# Patient Record
Sex: FEMALE | Race: WHITE | NOT HISPANIC OR LATINO | URBAN - METROPOLITAN AREA
[De-identification: names, ages, dates, MRNs, and addresses within clinical notes are randomized per-mention and may not be internally consistent; named-entity substitution may affect disease eponyms.]

---

## 2020-08-25 ENCOUNTER — LAB OUTSIDE AN ENCOUNTER (OUTPATIENT)
Dept: URBAN - METROPOLITAN AREA CLINIC 78 | Facility: CLINIC | Age: 70
End: 2020-08-25

## 2020-08-25 ENCOUNTER — OFFICE VISIT (OUTPATIENT)
Dept: URBAN - METROPOLITAN AREA CLINIC 23 | Facility: CLINIC | Age: 70
End: 2020-08-25
Payer: COMMERCIAL

## 2020-08-25 DIAGNOSIS — R19.7 ACUTE DIARRHEA: ICD-10-CM

## 2020-08-25 DIAGNOSIS — Z86.010 HISTORY OF ADENOMATOUS POLYP OF COLON: ICD-10-CM

## 2020-08-25 PROCEDURE — 1036F TOBACCO NON-USER: CPT | Performed by: INTERNAL MEDICINE

## 2020-08-25 PROCEDURE — G9622 NO UNHEAL ETOH USER: HCPCS | Performed by: INTERNAL MEDICINE

## 2020-08-25 PROCEDURE — G9903 PT SCRN TBCO ID AS NON USER: HCPCS | Performed by: INTERNAL MEDICINE

## 2020-08-25 PROCEDURE — G8427 DOCREV CUR MEDS BY ELIG CLIN: HCPCS | Performed by: INTERNAL MEDICINE

## 2020-08-25 PROCEDURE — 3017F COLORECTAL CA SCREEN DOC REV: CPT | Performed by: INTERNAL MEDICINE

## 2020-08-25 PROCEDURE — 99214 OFFICE O/P EST MOD 30 MIN: CPT | Performed by: INTERNAL MEDICINE

## 2020-08-25 PROCEDURE — G8420 CALC BMI NORM PARAMETERS: HCPCS | Performed by: INTERNAL MEDICINE

## 2020-08-25 RX ORDER — PANTOPRAZOLE SODIUM 40 MG/1
TAKE 1 TABLET (40 MG) BY ORAL ROUTE ONCE DAILY FOR 90 DAYS TABLET, DELAYED RELEASE ORAL 1
Qty: 90 | Refills: 3 | Status: ACTIVE | COMMUNITY
Start: 2019-12-16 | End: 2020-12-10

## 2020-08-25 RX ORDER — CIPROFLOXACIN HYDROCHLORIDE 500 MG/1
1 TABLET TABLET, FILM COATED ORAL BID
Qty: 20 TABLET | Refills: 0 | OUTPATIENT
Start: 2020-08-25 | End: 2020-09-04

## 2020-08-25 RX ORDER — METRONIDAZOLE 500 MG/1
1 TABLET TABLET, FILM COATED ORAL BID
Qty: 20 TABLET | Refills: 0 | OUTPATIENT
Start: 2020-08-25 | End: 2020-09-04

## 2020-08-25 NOTE — HPI-TODAY'S VISIT:
70-year-old  female with a lactose intolerance presents with 2 weeks history of acute onset diarrhea.  She ate ice cream is more than usual at the beginning of this episode.  4-5 hours she had to sit on the toilet.  Antibiotic use in June when she was having back surgery.  She tried Imodium couple doses, Pepto-Bismol a few days.  Currently she is not on any antidiarrheal.  She has limited her diet only to simple carbohydrate.  Had only one bowel movement yesterday, BSs type VI-VII.  She reports that is because she is not eating.  If she eats something it will blow out.  5 pounds of weight loss over the same time..  Denies blood in stool.  Her typical bowel habit 1 bowel movement with a solid-soft consistency stool.  Denies NSAIDs use.

## 2020-08-25 NOTE — PREVIOUS WORKUP REVIEWED
:ENDOSCOPIES:-EGD 1/16/2019: indication of her from. Normal esophagus. Mild gastropathy in the antrum. Multiple polyps in the gastric fundus and body. Otherwise normal stomach and duodenum.*Pathology: gastric body and antrum) reactive gastropathy and the mild chronic inflammation. No H. pylori or intestinal metaplasia. Gastric fundus polyp-fundic gland polyp-Colonoscopy 4/26/2017:A 3 mm polyp in the sigmoid colon. Normal TI. Otherwise normal colon.*Pathology: sigmoid colon polyp-colonic mucosa with prolapsed change.-Colonoscopy 8/15/2012: normal. Colonoscopy 5 years.-Colonoscopy 5/16/2012: a flat polyp in the cecum. 3 cm in size. It was removed Piecemeal technic. Tattooed. A sessile polyp in the transverse colon, 6 mm in size. Random colon biopsy to rule microscopic colitis.*Pathology: transverse colon polyp-prominent lymphoid garcia. Random colon-negative microscopic colitis. Cecal polyp-hyperplastic polyp.LABS:IMAGES:

## 2020-08-26 LAB
BUN/CREATININE RATIO: 10
BUN: 7
CARBON DIOXIDE, TOTAL: 25
CHLORIDE: 98
CREATININE: 0.71
EGFR IF AFRICN AM: 100
EGFR IF NONAFRICN AM: 87
GLUCOSE: 105
HEMATOCRIT: 37.4
HEMOGLOBIN: 12.7
MCH: 29.6
MCHC: 34
MCV: 87
NRBC: (no result)
PLATELETS: 345
POTASSIUM: 4.4
RBC: 4.29
RDW: 14.4
SODIUM: 142
WBC: 4.4

## 2020-08-28 ENCOUNTER — TELEPHONE ENCOUNTER (OUTPATIENT)
Dept: URBAN - METROPOLITAN AREA CLINIC 77 | Facility: CLINIC | Age: 70
End: 2020-08-28

## 2020-08-31 ENCOUNTER — TELEPHONE ENCOUNTER (OUTPATIENT)
Dept: URBAN - METROPOLITAN AREA CLINIC 77 | Facility: CLINIC | Age: 70
End: 2020-08-31

## 2020-08-31 LAB — GASTROINTESTINAL PATHOGEN: (no result)

## 2020-09-02 ENCOUNTER — TELEPHONE ENCOUNTER (OUTPATIENT)
Dept: URBAN - METROPOLITAN AREA CLINIC 77 | Facility: CLINIC | Age: 70
End: 2020-09-02

## 2020-09-04 ENCOUNTER — WEB ENCOUNTER (OUTPATIENT)
Dept: URBAN - METROPOLITAN AREA CLINIC 23 | Facility: CLINIC | Age: 70
End: 2020-09-04

## 2020-09-22 ENCOUNTER — OFFICE VISIT (OUTPATIENT)
Dept: URBAN - METROPOLITAN AREA CLINIC 23 | Facility: CLINIC | Age: 70
End: 2020-09-22
Payer: COMMERCIAL

## 2020-09-22 DIAGNOSIS — E73.9 LACTOSE INTOLERANCE: ICD-10-CM

## 2020-09-22 DIAGNOSIS — K58.0 IRRITABLE BOWEL SYNDROME WITH DIARRHEA: ICD-10-CM

## 2020-09-22 DIAGNOSIS — K21.9 CHRONIC GERD: ICD-10-CM

## 2020-09-22 PROBLEM — 197125005: Status: ACTIVE | Noted: 2020-09-22

## 2020-09-22 PROCEDURE — 3017F COLORECTAL CA SCREEN DOC REV: CPT | Performed by: INTERNAL MEDICINE

## 2020-09-22 PROCEDURE — 1036F TOBACCO NON-USER: CPT | Performed by: INTERNAL MEDICINE

## 2020-09-22 PROCEDURE — G8427 DOCREV CUR MEDS BY ELIG CLIN: HCPCS | Performed by: INTERNAL MEDICINE

## 2020-09-22 PROCEDURE — G8420 CALC BMI NORM PARAMETERS: HCPCS | Performed by: INTERNAL MEDICINE

## 2020-09-22 PROCEDURE — 99214 OFFICE O/P EST MOD 30 MIN: CPT | Performed by: INTERNAL MEDICINE

## 2020-09-22 PROCEDURE — G9622 NO UNHEAL ETOH USER: HCPCS | Performed by: INTERNAL MEDICINE

## 2020-09-22 RX ORDER — OMEPRAZOLE 20 MG/1
1 CAPSULE 30 MINUTES BEFORE MORNING MEAL CAPSULE, DELAYED RELEASE ORAL ONCE A DAY
Qty: 90 TABS | Refills: 1 | OUTPATIENT
Start: 2020-09-22

## 2020-09-22 RX ORDER — PANTOPRAZOLE SODIUM 40 MG/1
TAKE 1 TABLET (40 MG) BY ORAL ROUTE ONCE DAILY FOR 90 DAYS TABLET, DELAYED RELEASE ORAL 1
Qty: 90 | Refills: 3 | Status: ACTIVE | COMMUNITY
Start: 2019-12-16 | End: 2020-12-10

## 2020-09-22 NOTE — HPI-TODAY'S VISIT:
70-year-old  female present for follow-up of diarrhea.  At last visit, abx rxed. She took 1 day of antibiotics and developed leg cramping so she stopped it.  Her bowel movement is irregular, 1 day diarrhea then no bowel movements for couple days, then solid stools, then diarrhea again.  She claims certain foods causing diarrhea.  She is still significantly restricted her diet due to concern for having diarrhea. losing weight. She tried apple and carrot which caused diarrhea next day. She knows she has lactose intolerance, taking Lactaid pill which helps great.  She changed her PPI to Prilosec 20 mg with a counter since that she is wondering pantoprazole might have caused diarrhea.  Reflux symptoms are well controlled with the Prilosec.

## 2020-09-22 NOTE — PHYSICAL EXAM SKIN:
no rashes,  no suspicious lesions,  no areas of discoloration,  no jaundice present,  good turgor,  no masses,  no tenderness on palpation

## 2020-09-22 NOTE — PHYSICAL EXAM NECK/THYROID:
normal appearance,  without tenderness upon palpation,  no deformities,  no cervical lymphadenopathy,  no masses,  no thyroid nodules,  Thyroid normal size,  no JVD,  thyroid nontender

## 2020-09-22 NOTE — PHYSICAL EXAM NEUROLOGIC:
oriented to person, place and time,  normal sensation,  short and long term memory intact,  sensory exam intact

## 2020-09-22 NOTE — PHYSICAL EXAM GASTROINTESTINAL
Abdomen,  soft, nontender, nondistended,  no guarding or rigidity,  no masses palpable,  normal bowel sounds,  Liver and Spleen,  no hepatomegaly present,  no hepatosplenomegaly,  liver nontender,  spleen not palpable,  Self Exam: Abdomen soft, non-tender to palpatation, non-distended

## 2020-09-22 NOTE — PHYSICAL EXAM CHEST:
no lesions,  no deformities,  no traumatic injuries,  no significant scars are present,  chest wall non-tender,  no masses present,  tactile fremitus is normal,  breathing is unlabored without accessory muscle use.,  normal breath sounds.

## 2020-09-24 ENCOUNTER — OFFICE VISIT (OUTPATIENT)
Dept: URBAN - METROPOLITAN AREA TELEHEALTH 2 | Facility: TELEHEALTH | Age: 70
End: 2020-09-24
Payer: COMMERCIAL

## 2020-09-24 ENCOUNTER — WEB ENCOUNTER (OUTPATIENT)
Dept: URBAN - METROPOLITAN AREA CLINIC 23 | Facility: CLINIC | Age: 70
End: 2020-09-24

## 2020-09-24 DIAGNOSIS — K58.0 IRRITABLE BOWEL SYNDROME WITH DIARRHEA: ICD-10-CM

## 2020-09-24 PROCEDURE — 97802 MEDICAL NUTRITION INDIV IN: CPT | Performed by: DIETITIAN, REGISTERED

## 2020-09-24 RX ORDER — OMEPRAZOLE 20 MG/1
1 CAPSULE 30 MINUTES BEFORE MORNING MEAL CAPSULE, DELAYED RELEASE ORAL ONCE A DAY
Qty: 90 TABS | Refills: 1 | Status: ACTIVE | COMMUNITY
Start: 2020-09-22

## 2020-09-24 RX ORDER — PANTOPRAZOLE SODIUM 40 MG/1
TAKE 1 TABLET (40 MG) BY ORAL ROUTE ONCE DAILY FOR 90 DAYS TABLET, DELAYED RELEASE ORAL 1
Qty: 90 | Refills: 3 | Status: ACTIVE | COMMUNITY
Start: 2019-12-16 | End: 2020-12-10

## 2020-09-27 ENCOUNTER — WEB ENCOUNTER (OUTPATIENT)
Dept: URBAN - METROPOLITAN AREA CLINIC 23 | Facility: CLINIC | Age: 70
End: 2020-09-27

## 2021-03-17 ENCOUNTER — TELEPHONE ENCOUNTER (OUTPATIENT)
Dept: URBAN - METROPOLITAN AREA CLINIC 77 | Facility: CLINIC | Age: 71
End: 2021-03-17

## 2021-03-17 RX ORDER — OMEPRAZOLE 20 MG/1
1 CAPSULE 30 MINUTES BEFORE MORNING MEAL CAPSULE, DELAYED RELEASE ORAL ONCE A DAY
Qty: 90 | Refills: 3
Start: 2020-09-22

## 2022-02-08 ENCOUNTER — OFFICE VISIT (OUTPATIENT)
Dept: URBAN - METROPOLITAN AREA CLINIC 12 | Facility: CLINIC | Age: 72
End: 2022-02-08
Payer: COMMERCIAL

## 2022-02-08 DIAGNOSIS — R19.5 LOOSE STOOLS: ICD-10-CM

## 2022-02-08 DIAGNOSIS — R19.4 CHANGE IN BOWEL HABITS: ICD-10-CM

## 2022-02-08 DIAGNOSIS — Z83.71 FAMILY HISTORY OF COLONIC POLYPS: ICD-10-CM

## 2022-02-08 DIAGNOSIS — R12 HEARTBURN: ICD-10-CM

## 2022-02-08 PROBLEM — 398032003: Status: ACTIVE | Noted: 2022-02-08

## 2022-02-08 PROBLEM — 429969003: Status: ACTIVE | Noted: 2022-02-08

## 2022-02-08 PROCEDURE — 99214 OFFICE O/P EST MOD 30 MIN: CPT | Performed by: INTERNAL MEDICINE

## 2022-02-08 RX ORDER — OMEPRAZOLE 20 MG/1
1 TABLET 30 MINUTES BEFORE MORNING MEAL TABLET, DELAYED RELEASE ORAL ONCE A DAY
Qty: 90 | Refills: 3 | OUTPATIENT
Start: 2022-02-08

## 2022-02-08 RX ORDER — SODIUM, POTASSIUM,MAG SULFATES 17.5-3.13G
344MLL AS DIRECTED SOLUTION, RECONSTITUTED, ORAL ORAL
Qty: 1 KIT | Refills: 0 | OUTPATIENT
Start: 2022-02-08 | End: 2022-02-10

## 2022-02-08 RX ORDER — OMEPRAZOLE 20 MG/1
1 CAPSULE 30 MINUTES BEFORE MORNING MEAL CAPSULE, DELAYED RELEASE ORAL ONCE A DAY
Qty: 90 | Refills: 3 | Status: ACTIVE | COMMUNITY
Start: 2020-09-22

## 2022-02-08 NOTE — HPI-TODAY'S VISIT:
This is a 71-year-old woman who presents for evaluation.  She had seen Dr. Iverson on September 22, 2020.  Prior to that I had seen her in 2018.  She has a family history of colon polyps and her last colonoscopy was in April 2017.  She does have a tattoo in the cecum and post polypectomy scar noted there.  She also has been noted to have a benign liver lesion which has been stable on previous MRI with most recent one noted on June 18, 2019. She has been unable to wean off her PPI.  She does note that omeprazole 20 mg/day does control her symptoms well.  However when she stops taking it reduces this to less frequent then she will have problems with recurrent indigestion heartburn and reflux.  There is been no abdominal pain.  There is no nausea or vomiting. There has been no dysphagia or odynophagia. She had EGD with normal esophagus in 2019. I have reviewed this with her today as well as her medication list .  She does note intermittent episodes of loose stools and diarrhea at times.  Her current bowel movements are regular now though.  States that she does take VSL #3 probiotic daily.  She has had loose stools which seem to occur intermittently.  There is no history of lactose intolerance or celiac disease.  No new medications reported.  Of note she does take a number of supplements including magnesium daily.  There is been no fever chills or sweats.  No weight loss.  No melena or hematochezia.  No chest pain or shortness of breath.

## 2022-03-09 ENCOUNTER — LAB OUTSIDE AN ENCOUNTER (OUTPATIENT)
Dept: URBAN - METROPOLITAN AREA CLINIC 12 | Facility: CLINIC | Age: 72
End: 2022-03-09

## 2022-03-10 ENCOUNTER — ERX REFILL RESPONSE (OUTPATIENT)
Dept: URBAN - METROPOLITAN AREA CLINIC 78 | Facility: CLINIC | Age: 72
End: 2022-03-10

## 2022-03-10 RX ORDER — OMEPRAZOLE 20 MG/1
1 CAPSULE 30 MINUTES BEFORE MORNING MEAL ONCE A DAY ORALLY 90 DAYS CAPSULE, DELAYED RELEASE ORAL
Qty: 90 CAPSULE | Refills: 4 | OUTPATIENT

## 2022-03-10 RX ORDER — OMEPRAZOLE 20 MG/1
1 CAPSULE 30 MINUTES BEFORE MORNING MEAL CAPSULE, DELAYED RELEASE ORAL ONCE A DAY
Qty: 90 | Refills: 3 | OUTPATIENT

## 2022-03-11 LAB
IMMUNOGLOBULIN A, QN, SERUM: 106
T-TRANSGLUTAMINASE (TTG) IGA: <2

## 2022-04-05 PROBLEM — 129851009: Status: ACTIVE | Noted: 2022-02-08

## 2022-04-18 ENCOUNTER — OFFICE VISIT (OUTPATIENT)
Dept: URBAN - METROPOLITAN AREA LAB 3 | Facility: LAB | Age: 72
End: 2022-04-18
Payer: COMMERCIAL

## 2022-04-18 DIAGNOSIS — R19.4 ALTERATION IN BOWEL ELIMINATION: ICD-10-CM

## 2022-04-18 PROCEDURE — 45380 COLONOSCOPY AND BIOPSY: CPT | Performed by: INTERNAL MEDICINE

## 2022-12-04 ENCOUNTER — ERX REFILL RESPONSE (OUTPATIENT)
Dept: URBAN - METROPOLITAN AREA CLINIC 12 | Facility: CLINIC | Age: 72
End: 2022-12-04

## 2022-12-04 RX ORDER — OMEPRAZOLE 20 MG/1
TAKE 1 TABLET 30 MINUTES BEFORE MORNING MEAL ORALLY ONCE A DAY 90 DAYS (NOT COVERED) TABLET, DELAYED RELEASE ORAL
Qty: 90 TABLET | Refills: 1 | OUTPATIENT

## 2022-12-04 RX ORDER — OMEPRAZOLE 20 MG/1
1 TABLET 30 MINUTES BEFORE MORNING MEAL TABLET, DELAYED RELEASE ORAL ONCE A DAY
Qty: 90 | Refills: 3 | OUTPATIENT

## 2023-10-18 ENCOUNTER — TELEPHONE ENCOUNTER (OUTPATIENT)
Dept: URBAN - METROPOLITAN AREA CLINIC 23 | Facility: CLINIC | Age: 73
End: 2023-10-18

## 2024-04-08 ENCOUNTER — OV CON (OUTPATIENT)
Dept: URBAN - METROPOLITAN AREA CLINIC 12 | Facility: CLINIC | Age: 74
End: 2024-04-08
Payer: COMMERCIAL

## 2024-04-08 VITALS
SYSTOLIC BLOOD PRESSURE: 125 MMHG | DIASTOLIC BLOOD PRESSURE: 74 MMHG | HEART RATE: 66 BPM | HEIGHT: 60 IN | WEIGHT: 120.4 LBS | BODY MASS INDEX: 23.64 KG/M2

## 2024-04-08 DIAGNOSIS — R19.4 CHANGE IN BOWEL HABITS: ICD-10-CM

## 2024-04-08 DIAGNOSIS — K21.9 CHRONIC GERD: ICD-10-CM

## 2024-04-08 DIAGNOSIS — K58.0 IRRITABLE BOWEL SYNDROME WITH DIARRHEA: ICD-10-CM

## 2024-04-08 PROBLEM — 235595009: Status: ACTIVE | Noted: 2024-04-08

## 2024-04-08 PROCEDURE — 99244 OFF/OP CNSLTJ NEW/EST MOD 40: CPT | Performed by: STUDENT IN AN ORGANIZED HEALTH CARE EDUCATION/TRAINING PROGRAM

## 2024-04-08 RX ORDER — ROSUVASTATIN CALCIUM 5 MG/1
1 TABLET TABLET, COATED ORAL
Status: ACTIVE | COMMUNITY

## 2024-04-08 RX ORDER — OMEPRAZOLE 20 MG/1
TAKE 1 TABLET 30 MINUTES BEFORE MORNING MEAL ORALLY ONCE A DAY 90 DAYS (NOT COVERED) TABLET, DELAYED RELEASE ORAL
Qty: 90 TABLET | Refills: 1 | Status: ON HOLD | COMMUNITY

## 2024-04-08 RX ORDER — ICOSAPENT ETHYL 1000 MG/1
2 CAPSULES WITH MEALS CAPSULE ORAL TWICE A DAY
Status: ACTIVE | COMMUNITY

## 2024-04-08 RX ORDER — MIRTAZAPINE 45 MG/1
1 TABLET AT BEDTIME TABLET, FILM COATED ORAL
Refills: 0 | Status: ACTIVE | COMMUNITY
Start: 1900-01-01

## 2024-04-08 RX ORDER — IRBESARTAN 150 MG/1
1 TABLET TABLET, FILM COATED ORAL
Refills: 0 | Status: ACTIVE | COMMUNITY
Start: 1900-01-01

## 2024-04-08 RX ORDER — LEVOTHYROXINE SODIUM 75 UG/1
1 TABLET IN THE MORNING ON AN EMPTY STOMACH TABLET ORAL
Refills: 0 | Status: ACTIVE | COMMUNITY
Start: 1900-01-01

## 2024-04-08 RX ORDER — DONEPEZIL HYDROCHLORIDE 10 MG/1
1 TABLET AT BEDTIME TABLET, FILM COATED ORAL
Status: ACTIVE | COMMUNITY

## 2024-04-08 RX ORDER — OMEPRAZOLE 20 MG/1
1 CAPSULE 30 MINUTES BEFORE MORNING MEAL ONCE A DAY ORALLY 90 DAYS CAPSULE, DELAYED RELEASE ORAL
Qty: 90 CAPSULE | Refills: 4 | Status: ON HOLD | COMMUNITY

## 2024-04-08 RX ORDER — UBIDECARENONE 30 MG
1/2 TABLET WITH A MEAL CAPSULE ORAL
Refills: 0 | Status: ACTIVE | COMMUNITY
Start: 1900-01-01

## 2024-04-08 RX ORDER — RIFAXIMIN 550 MG/1
1 TABLET TABLET ORAL THREE TIMES A DAY
Qty: 42 TABLET | Refills: 0 | OUTPATIENT
Start: 2024-04-08 | End: 2024-04-22

## 2024-04-08 RX ORDER — ACETAMINOPHEN 325 MG
1 CAPSULE TABLET ORAL ONCE A DAY
Refills: 0 | Status: ACTIVE | COMMUNITY
Start: 1900-01-01

## 2024-04-08 RX ORDER — DOCUSATE SODIUM 100 MG/1
1 CAPSULE AS NEEDED CAPSULE, LIQUID FILLED ORAL
Refills: 0 | Status: ACTIVE | COMMUNITY
Start: 1900-01-01

## 2024-04-08 RX ORDER — UBIDECARENONE 75 MG
AS DIRECTED CAPSULE ORAL
Refills: 0 | Status: ACTIVE | COMMUNITY
Start: 1900-01-01

## 2024-04-08 RX ORDER — PREGABALIN 75 MG/1
1 CAPSULE CAPSULE ORAL TWICE A DAY
Status: ACTIVE | COMMUNITY

## 2024-04-08 RX ORDER — LEVOTHYROXINE SODIUM 88 UG/1
1 TABLET IN THE MORNING ON AN EMPTY STOMACH TABLET ORAL
Status: ACTIVE | COMMUNITY

## 2024-04-08 RX ORDER — OMEPRAZOLE 20 MG/1
1 CAPSULE 30 MINUTES BEFORE MORNING MEAL CAPSULE, DELAYED RELEASE ORAL ONCE A DAY
Qty: 90 | Refills: 3 | OUTPATIENT
Start: 2024-04-08

## 2024-04-08 NOTE — HPI-TODAY'S VISIT:
This patient was referred by Dr. Rakel Caballero for evaluation of IBS. A copy of this note will be sent to the referring provider. 74 yo F here for follow up of diarrhea Has had episodes of loose stool recently, even episodes of fecal incontinence  Says that she had similar Sx when she had COVID last year.  She put herself on BRAT diet. Says diarrhea returns when she tries to advance her diet.  Took imodium, says 1/2 a pill does work for her to even stop her up.  Last colonoscopy with Dr. Weiner in 2022 reviewed, normal colon bx.  Says she feels mostly fine, sometimes has fatigue though.

## 2024-04-08 NOTE — HPI-OTHER HISTORIES
Last visit: This is a 71-year-old woman who presents for evaluation. She had seen Dr. Iverson on September 22, 2020. Prior to that I had seen her in 2018. She has a family history of colon polyps and her last colonoscopy was in April 2017. She does have a tattoo in the cecum and post polypectomy scar noted there. She also has been noted to have a benign liver lesion which has been stable on previous MRI with most recent one noted on June 18, 2019.        She has been unable to wean off her PPI. She does note that omeprazole 20 mg/day does control her symptoms well. However when she stops taking it reduces this to less frequent then she will have problems with recurrent indigestion heartburn and reflux. There is been no abdominal pain. There is no nausea or vomiting. There has been no dysphagia or odynophagia. She had EGD with normal esophagus in 2019. I have reviewed this with her today as well as her medication list .        She does note intermittent episodes of loose stools and diarrhea at times. Her current bowel movements are regular now though. States that she does take VSL #3 probiotic daily. She has had loose stools which seem to occur intermittently. There is no history of lactose intolerance or celiac disease. No new medications reported. Of note she does take a number of supplements including magnesium daily. There is been no fever chills or sweats. No weight loss. No melena or hematochezia. No chest pain or shortness of breath.

## 2024-04-14 LAB — CALPROTECTIN, FECAL: 12

## 2024-05-14 ENCOUNTER — TELEPHONE ENCOUNTER (OUTPATIENT)
Dept: URBAN - METROPOLITAN AREA CLINIC 11 | Facility: CLINIC | Age: 74
End: 2024-05-14

## 2025-03-10 ENCOUNTER — WEB ENCOUNTER (OUTPATIENT)
Dept: URBAN - METROPOLITAN AREA CLINIC 12 | Facility: CLINIC | Age: 75
End: 2025-03-10

## 2025-03-10 RX ORDER — OMEPRAZOLE 20 MG/1
1 CAPSULE 30 MINUTES BEFORE MORNING MEAL CAPSULE, DELAYED RELEASE ORAL ONCE A DAY
Qty: 30 | Refills: 2 | OUTPATIENT
Start: 2024-04-08

## 2025-07-23 ENCOUNTER — DASHBOARD ENCOUNTERS (OUTPATIENT)
Age: 75
End: 2025-07-23

## 2025-07-23 ENCOUNTER — OFFICE VISIT (OUTPATIENT)
Dept: URBAN - METROPOLITAN AREA CLINIC 12 | Facility: CLINIC | Age: 75
End: 2025-07-23
Payer: COMMERCIAL

## 2025-07-23 DIAGNOSIS — K21.9 CHRONIC GERD: ICD-10-CM

## 2025-07-23 DIAGNOSIS — R03.0 ELEVATED BLOOD PRESSURE READING: ICD-10-CM

## 2025-07-23 DIAGNOSIS — Z86.0101 PERSONAL HISTORY OF ADENOMATOUS AND SERRATED COLON POLYPS: ICD-10-CM

## 2025-07-23 DIAGNOSIS — R19.5 LOOSE STOOLS: ICD-10-CM

## 2025-07-23 PROCEDURE — 99214 OFFICE O/P EST MOD 30 MIN: CPT | Performed by: STUDENT IN AN ORGANIZED HEALTH CARE EDUCATION/TRAINING PROGRAM

## 2025-07-23 RX ORDER — LEVOTHYROXINE SODIUM 75 UG/1
1 TABLET IN THE MORNING ON AN EMPTY STOMACH TABLET ORAL
Refills: 0 | Status: ACTIVE | COMMUNITY
Start: 1900-01-01

## 2025-07-23 RX ORDER — UBIDECARENONE 30 MG
1/2 TABLET WITH A MEAL CAPSULE ORAL
Refills: 0 | Status: DISCONTINUED | COMMUNITY
Start: 1900-01-01

## 2025-07-23 RX ORDER — OMEPRAZOLE 20 MG/1
1 CAPSULE 1/2 TO 1 HOUR BEFORE MORNING MEAL CAPSULE, DELAYED RELEASE ORAL ONCE A DAY
Qty: 90 | Refills: 3 | OUTPATIENT
Start: 2025-07-23

## 2025-07-23 RX ORDER — DONEPEZIL HYDROCHLORIDE 10 MG/1
1 TABLET AT BEDTIME TABLET, FILM COATED ORAL
Status: ACTIVE | COMMUNITY

## 2025-07-23 RX ORDER — IRBESARTAN 150 MG/1
1 TABLET TABLET ORAL
Refills: 0 | Status: ACTIVE | COMMUNITY
Start: 1900-01-01

## 2025-07-23 RX ORDER — LEVOTHYROXINE SODIUM 88 UG/1
1 TABLET IN THE MORNING ON AN EMPTY STOMACH TABLET ORAL
Status: ACTIVE | COMMUNITY

## 2025-07-23 RX ORDER — UBIDECARENONE 75 MG
AS DIRECTED CAPSULE ORAL
Refills: 0 | Status: ACTIVE | COMMUNITY
Start: 1900-01-01

## 2025-07-23 RX ORDER — DOCUSATE SODIUM 100 MG/1
1 CAPSULE AS NEEDED CAPSULE, LIQUID FILLED ORAL
Refills: 0 | Status: ACTIVE | COMMUNITY
Start: 1900-01-01

## 2025-07-23 RX ORDER — OMEPRAZOLE 20 MG/1
1 CAPSULE 30 MINUTES BEFORE MORNING MEAL CAPSULE, DELAYED RELEASE ORAL ONCE A DAY
Qty: 30 | Refills: 2 | Status: ACTIVE | COMMUNITY
Start: 2024-04-08

## 2025-07-23 RX ORDER — ROSUVASTATIN CALCIUM 5 MG/1
1 TABLET TABLET, COATED ORAL
Status: ACTIVE | COMMUNITY

## 2025-07-23 RX ORDER — ICOSAPENT ETHYL 1000 MG/1
2 CAPSULES WITH MEALS CAPSULE ORAL TWICE A DAY
Status: ACTIVE | COMMUNITY

## 2025-07-23 RX ORDER — MIRTAZAPINE 45 MG/1
1 TABLET AT BEDTIME TABLET, FILM COATED ORAL
Refills: 0 | Status: ACTIVE | COMMUNITY
Start: 1900-01-01

## 2025-07-23 RX ORDER — ACETAMINOPHEN 325 MG
1 CAPSULE TABLET ORAL ONCE A DAY
Refills: 0 | Status: ACTIVE | COMMUNITY
Start: 1900-01-01

## 2025-07-23 RX ORDER — PREGABALIN 75 MG/1
1 CAPSULE CAPSULE ORAL TWICE A DAY
Status: ACTIVE | COMMUNITY

## 2025-07-23 NOTE — HPI-OTHER HISTORIES
Last visit 2024: This patient was referred by Dr. Rakel Caballero for evaluation of IBS. A copy of this note will be sent to the referring provider. 74 yo F here for follow up of diarrhea Has had episodes of loose stool recently, even episodes of fecal incontinence  Says that she had similar Sx when she had COVID last year.  She put herself on BRAT diet. Says diarrhea returns when she tries to advance her diet.  Took imodium, says 1/2 a pill does work for her to even stop her up.  Last colonoscopy with Dr. Weiner in 2022 reviewed, normal colon bx.  Says she feels mostly fine, sometimes has fatigue though.

## 2025-07-23 NOTE — HPI-TODAY'S VISIT:
76 yo F here for follow up. Last seen in 2024. Since her last visit she says she sometimes has had a few episodes of diarrhea. Last Thurs she had diarrhea, took 2 imodium. Her BMs are back to normal now.